# Patient Record
Sex: FEMALE | Race: WHITE | NOT HISPANIC OR LATINO | Employment: UNEMPLOYED | URBAN - METROPOLITAN AREA
[De-identification: names, ages, dates, MRNs, and addresses within clinical notes are randomized per-mention and may not be internally consistent; named-entity substitution may affect disease eponyms.]

---

## 2023-10-16 ENCOUNTER — OFFICE VISIT (OUTPATIENT)
Dept: PRIMARY CARE | Facility: CLINIC | Age: 33
End: 2023-10-16
Payer: COMMERCIAL

## 2023-10-16 VITALS
OXYGEN SATURATION: 98 % | HEART RATE: 98 BPM | TEMPERATURE: 95.8 F | BODY MASS INDEX: 38.09 KG/M2 | HEIGHT: 63 IN | DIASTOLIC BLOOD PRESSURE: 84 MMHG | SYSTOLIC BLOOD PRESSURE: 128 MMHG | WEIGHT: 215 LBS

## 2023-10-16 DIAGNOSIS — Z00.00 WELL ADULT EXAM: Primary | ICD-10-CM

## 2023-10-16 DIAGNOSIS — E04.9 ENLARGED THYROID: ICD-10-CM

## 2023-10-16 DIAGNOSIS — R53.83 OTHER FATIGUE: ICD-10-CM

## 2023-10-16 PROBLEM — O14.10 SEVERE PRE-ECLAMPSIA (HHS-HCC): Status: ACTIVE | Noted: 2018-10-09

## 2023-10-16 PROCEDURE — 3079F DIAST BP 80-89 MM HG: CPT | Performed by: FAMILY MEDICINE

## 2023-10-16 PROCEDURE — 3074F SYST BP LT 130 MM HG: CPT | Performed by: FAMILY MEDICINE

## 2023-10-16 PROCEDURE — 99395 PREV VISIT EST AGE 18-39: CPT | Performed by: FAMILY MEDICINE

## 2023-10-16 ASSESSMENT — PATIENT HEALTH QUESTIONNAIRE - PHQ9
SUM OF ALL RESPONSES TO PHQ9 QUESTIONS 1 AND 2: 0
1. LITTLE INTEREST OR PLEASURE IN DOING THINGS: NOT AT ALL
2. FEELING DOWN, DEPRESSED OR HOPELESS: NOT AT ALL

## 2023-10-16 NOTE — PROGRESS NOTES
Subjective   Patient ID: Francy Zafar is a 33 y.o. female who presents for well exam  HPI  Has had increased heat production and having low energy.  She restarted periods and are getting regular.  Normal menstruation.  Gets anxiuos sensation.  She states that they started coming on over 2 weeks ago.  Blood flow.  Having more headaches over the past couple weeks.  Bacnd around the head disctribution.  No lights, nasuea or vomiting.  She does have some vertigo.        The patient's health since the last visit is described as good. There are no interval changes in the patient's PMH, PSH, and current medications. There are no interval changes in the patient's social and family history. She has regular dental visits. She denies vision problems. She denies hearing loss.   Lifestyle: She consumes a diverse and healthy diet.  Mostly organic.   She does not have any weight concerns. She exercises regularly. She does not use tobacco. She denies alcohol use.   Reproductive health: the patient is postmenopausal.   Cervical cancer screening: cancer screening reviewed and current . patient has no history of an abnormal pap smear.     Surgical History  Problems    · No history of surgery     Family History  Father    · Family history of Benign hypertension  Grandparent    · Family history of diabetes mellitus    · Family history of malignant neoplasm ( Grandmother had esophageal)   · Family history of myocardial infarction   Uncle    · FH: kidney failure      Social History  Problems    · Never a smoker   · Social alcohol use     Allergies  Medication    · Penicillins  NonMedication    · IV Contrast Dye    Review of Systems    Objective   Physical Exam  General: Patient is alert and oriented Ãƒâ€”3 and appears in no acute distress. No respiratory distress.     Head: Atraumatic normocephalic.     Eyes: EOMI, PERRLA      Ears: Canals patent without any irritation, tympanic membranes without inflammation, no swelling, normal  light reflex.     Nose: Nares patent. Turbinates are not swollen. No discharge.     Mouth: Normal mucosa. Moist. No erythema, exudates, tonsillar enlargement.     Neck: Normal range of motion, no masses. Thyroid is palpable and normal in size without any nodules. No anterior cervical or posterior cervical adenopathy.     Heart: Regular rate and rhythm, no murmurs clicks or gallops     Lungs: Clear to auscultation bilaterally without any rhonchi rales or wheezing, lung sounds heard throughout all lung fields     Abdomen: Soft, nontender, no rigidity, rebound, guarding or organomegaly. Bowel sounds Ãƒâ€”4 quadrants.     Musculoskeletal: Normal range of motion, strength is grossly intact in the proximal distal muscles of the upper and lower extremities bilaterally, deep tendon reflexes +2 out of 4 and symmetric bilaterally at the patella, Achilles, biceps, triceps, sensation intact.     Nerves: Cranial nerves II through XII appear grossly intact and without deficit     Skin: Intact, dry, no rashes or erythema     Psych: Normal affect.  Assessment/Plan   Problem List Items Addressed This Visit    None  Visit Diagnoses       Well adult exam    -  Primary    Other fatigue            Patient is a well 33-year-old female  Anticipatory guidance given  Labs ordered  Follow-up as needed or in 1 year for well check

## 2023-10-25 ENCOUNTER — PATIENT MESSAGE (OUTPATIENT)
Dept: PRIMARY CARE | Facility: CLINIC | Age: 33
End: 2023-10-25
Payer: COMMERCIAL

## 2023-10-25 DIAGNOSIS — R79.89 ELEVATED LFTS: Primary | ICD-10-CM

## 2023-10-27 ENCOUNTER — HOSPITAL ENCOUNTER (OUTPATIENT)
Dept: RADIOLOGY | Facility: HOSPITAL | Age: 33
Discharge: HOME | End: 2023-10-27
Payer: COMMERCIAL

## 2023-10-27 DIAGNOSIS — E04.9 ENLARGED THYROID: ICD-10-CM

## 2023-10-27 DIAGNOSIS — R79.89 ELEVATED LFTS: ICD-10-CM

## 2023-10-27 PROCEDURE — 76536 US EXAM OF HEAD AND NECK: CPT | Performed by: RADIOLOGY

## 2023-10-27 PROCEDURE — 76705 ECHO EXAM OF ABDOMEN: CPT

## 2023-10-27 PROCEDURE — 76536 US EXAM OF HEAD AND NECK: CPT

## 2023-10-27 PROCEDURE — 76705 ECHO EXAM OF ABDOMEN: CPT | Performed by: RADIOLOGY

## 2023-10-29 NOTE — RESULT ENCOUNTER NOTE
Let the patient know taht the US overall was normal except she has gall stones  Not obstructing at this time

## 2023-10-30 ENCOUNTER — TELEPHONE (OUTPATIENT)
Dept: PRIMARY CARE | Facility: CLINIC | Age: 33
End: 2023-10-30
Payer: COMMERCIAL

## 2023-10-30 DIAGNOSIS — R79.89 ELEVATED LFTS: Primary | ICD-10-CM

## 2023-10-30 NOTE — TELEPHONE ENCOUNTER
----- Message from Nik Reynaga DO sent at 10/28/2023 10:36 PM EDT -----  Let patient know that the US of the thyroid was normal

## 2023-10-30 NOTE — TELEPHONE ENCOUNTER
----- Message from Nik Reynaga DO sent at 10/28/2023 10:40 PM EDT -----  Let the patient know taht the US overall was normal except she has gall stones  Not obstructing at this time

## 2024-02-13 ENCOUNTER — OFFICE VISIT (OUTPATIENT)
Dept: PRIMARY CARE | Facility: CLINIC | Age: 34
End: 2024-02-13
Payer: COMMERCIAL

## 2024-02-13 VITALS
HEIGHT: 63 IN | SYSTOLIC BLOOD PRESSURE: 108 MMHG | HEART RATE: 78 BPM | OXYGEN SATURATION: 98 % | DIASTOLIC BLOOD PRESSURE: 64 MMHG | WEIGHT: 204 LBS | BODY MASS INDEX: 36.14 KG/M2 | TEMPERATURE: 97.9 F

## 2024-02-13 DIAGNOSIS — R42 LIGHTHEADEDNESS: ICD-10-CM

## 2024-02-13 DIAGNOSIS — K80.20 CALCULUS OF GALLBLADDER WITHOUT CHOLECYSTITIS WITHOUT OBSTRUCTION: ICD-10-CM

## 2024-02-13 DIAGNOSIS — G44.82 HEADACHE ASSOCIATED WITH SEXUAL ACTIVITY: ICD-10-CM

## 2024-02-13 DIAGNOSIS — R07.89 ATYPICAL CHEST PAIN: Primary | ICD-10-CM

## 2024-02-13 DIAGNOSIS — R79.89 ELEVATED LFTS: ICD-10-CM

## 2024-02-13 PROCEDURE — 93000 ELECTROCARDIOGRAM COMPLETE: CPT | Performed by: FAMILY MEDICINE

## 2024-02-13 PROCEDURE — 99214 OFFICE O/P EST MOD 30 MIN: CPT | Performed by: FAMILY MEDICINE

## 2024-02-13 PROCEDURE — 3074F SYST BP LT 130 MM HG: CPT | Performed by: FAMILY MEDICINE

## 2024-02-13 PROCEDURE — 3078F DIAST BP <80 MM HG: CPT | Performed by: FAMILY MEDICINE

## 2024-02-13 RX ORDER — SUMATRIPTAN 50 MG/1
50 TABLET, FILM COATED ORAL ONCE AS NEEDED
Qty: 10 TABLET | Refills: 11 | Status: SHIPPED | OUTPATIENT
Start: 2024-02-13 | End: 2024-06-12

## 2024-02-13 ASSESSMENT — PATIENT HEALTH QUESTIONNAIRE - PHQ9
1. LITTLE INTEREST OR PLEASURE IN DOING THINGS: NOT AT ALL
2. FEELING DOWN, DEPRESSED OR HOPELESS: NOT AT ALL
SUM OF ALL RESPONSES TO PHQ9 QUESTIONS 1 AND 2: 0

## 2024-02-13 NOTE — PROGRESS NOTES
Subjective   Patient ID: Francy Zafar is a 33 y.o. female who presents for Dizziness (Dizziness comes and goes, sometimes a bad pain on left side under her breast then moves up in her arm. Only happens at night. Not breastfeeding anymore. Blood pressure is good. Goes to a chiropractor. Her left side by ear swells up sometimes and get dizzy when she has intercourse and exercises . When she starts her menstrual cycle starts she gets a bad migraine).  HPI  Patient had not been feeling right since Oct.  She had gall stones and this seems to improve on the AF Beta food.  Headache after sexual activity.  She Also has tried to start exercising and causes lightheaded and dizziness.  Last Thursday she bent down and then standing up felt lightheaded and dizzy and felt like a brick on her chest.  It seemed to last a long time. She will get a stomach pain unde the left rib cage at night and will radiate to the left shoulder.   She denies palpitations on a regular basis.  Having regular periuods and having a migraine the day before.   Family history of grandmother having MI at 39 but was a smoker.  Uncle had an MI in his 50's/    The headache after sex is a pressure headache vs a pain.        Dizziness, is not vertigo but a lightheaded and feels like she is going to be nauseous.  She feels like the left neck will swell at times.  She feels it is worse sitting to standing or spinning around fast or bending and standing.   She is staying hydrated.      Had elevated LFT's in October and they were coming down.  Deneis RUQ pain at this time.      Does not feell mentally anxious but body feels anxious.        Review of Systems  .  Objective   Physical Exam  General: Patient is alert and oriented ×3 and appears in no acute distress. No respiratory distress.    Head: Atraumatic normocephalic.    Eyes: EOMI, PERRLA      Ears: Canals patent without any irritation, tympanic membranes without inflammation, no swelling, normal light  reflex.  Hearing intact bilaterally    Mouth: Normal mucosa. Moist. No erythema, exudates, tonsillar enlargement.    Neck: Normal range of motion, no masses.  Thyroid is palpable and normal in size without any nodules. No anterior cervical or posterior cervical adenopathy.    Heart: Regular rate and rhythm, no murmurs clicks or gallops    Lungs: Clear to auscultation bilaterally without any rhonchi rales or wheezing, lung sounds heard throughout all lung fields    Abdomen: Soft, nontender, no rigidity, rebound, guarding or organomegaly. Bowel sounds ×4 quadrants.    Musculoskeletal: Normal range of motion, strength is grossly intact in the proximal distal muscles of the upper and lower extremities bilaterally, deep tendon reflexes +2 out of 4 and symmetric bilaterally at the patella, Achilles, biceps, triceps, sensation intact.    Nerves: Cranial nerves II through XII appear grossly intact and without deficit    Skin: Intact, dry, no rashes or erythema    Psych: Normal affect.   Assessment/Plan   Problem List Items Addressed This Visit    None  Visit Diagnoses       Atypical chest pain    -  Primary    Relevant Orders    ECG 12 Lead    High sensitivity CRP    Lightheadedness        Relevant Orders    CBC and Auto Differential    Comprehensive Metabolic Panel    TSH with reflex to Free T4 if abnormal    Cortisol AM    Elevated LFTs        Relevant Orders    Gamma GT    Calculus of gallbladder without cholecystitis without obstruction        Headache associated with sexual activity            Atypical CP  - Labs ordered  - EKG done in the office and showed normal sinus rate and rhythm      Lightheadedness  - Orthostatic BP done in the office and were normal with laying 102/78, sitting 104/80, standing 110/80.    Elevated LFT's  - Repeating    Cholelithiasis  US 10/23.     Sexual Headache  - Start Sumatriptan 50 mg and can take at the very onset of a migraine or take 30 min prior to sexual activity.   If this causes  issues or side effects we will prescribe Ubrelvy.  Copay card given if needed.      Follow up in 6 weeks or as needed

## 2024-03-26 ENCOUNTER — OFFICE VISIT (OUTPATIENT)
Dept: PRIMARY CARE | Facility: CLINIC | Age: 34
End: 2024-03-26
Payer: COMMERCIAL

## 2024-03-26 VITALS
SYSTOLIC BLOOD PRESSURE: 108 MMHG | HEIGHT: 63 IN | OXYGEN SATURATION: 97 % | WEIGHT: 203 LBS | TEMPERATURE: 97.5 F | DIASTOLIC BLOOD PRESSURE: 74 MMHG | BODY MASS INDEX: 35.97 KG/M2 | HEART RATE: 71 BPM

## 2024-03-26 DIAGNOSIS — G44.82 HEADACHE ASSOCIATED WITH SEXUAL ACTIVITY: Primary | ICD-10-CM

## 2024-03-26 DIAGNOSIS — L30.9 DERMATITIS: ICD-10-CM

## 2024-03-26 DIAGNOSIS — R42 LIGHTHEADEDNESS: ICD-10-CM

## 2024-03-26 PROCEDURE — 3074F SYST BP LT 130 MM HG: CPT | Performed by: FAMILY MEDICINE

## 2024-03-26 PROCEDURE — 1036F TOBACCO NON-USER: CPT | Performed by: FAMILY MEDICINE

## 2024-03-26 PROCEDURE — 3078F DIAST BP <80 MM HG: CPT | Performed by: FAMILY MEDICINE

## 2024-03-26 PROCEDURE — 99214 OFFICE O/P EST MOD 30 MIN: CPT | Performed by: FAMILY MEDICINE

## 2024-03-26 RX ORDER — CLOTRIMAZOLE AND BETAMETHASONE DIPROPIONATE 10; .64 MG/G; MG/G
1 CREAM TOPICAL 2 TIMES DAILY
Qty: 30 G | Refills: 0 | Status: SHIPPED | OUTPATIENT
Start: 2024-03-26 | End: 2024-04-09

## 2024-03-26 ASSESSMENT — PATIENT HEALTH QUESTIONNAIRE - PHQ9
2. FEELING DOWN, DEPRESSED OR HOPELESS: NOT AT ALL
1. LITTLE INTEREST OR PLEASURE IN DOING THINGS: NOT AT ALL
SUM OF ALL RESPONSES TO PHQ9 QUESTIONS 1 AND 2: 0

## 2024-03-26 NOTE — PROGRESS NOTES
Subjective   Patient ID: Francy Zafar is a 33 y.o. female who presents for Follow-up (6 WEEK FOLLOW UP/Did take the meds you gave her, it worked with taking the pain away , but she was sweating and agitated. /Spot on her finger right hand middle finger and spot on her buttocks like a red rash in a [place] had it for about a week. /Bottom lip burns on the inside. Hasn't linked a food to it.).  HPI  Sumitriptan worked but caused side effects    Patient had a spot on her left buttock and right buttock.  She states started 1 week ago.  No itchiness or irritaion but burns in the shower.  Right hand middle finger feels tender and raw.  No itching.    Has tried young living healing healing lotion and skin repair.    Nobody at home has similar rashes.    No new soaps lotions or detergents.  Has 2 dogs.  Mostly inside.    Review of Systems    Objective   Physical Exam  General: Patient is alert and oriented ×3 and appears in no acute distress. No respiratory distress.    Head: Atraumatic normocephalic.    Eyes: EOMI, PERRLA      Heart: Regular rate and rhythm, no murmurs clicks or gallops    Lungs: Clear to auscultation bilaterally without any rhonchi rales or wheezing, lung sounds heard throughout all lung fields    Nerves: Cranial nerves II through XII appear grossly intact and without deficit    Skin: Left buttock there is a 1.25 cm annular skin lesion raised border dry center, right middle finger laterally there is an area that is dry with some tiny vesicular lesions noted towards the tip of the finger    Psych: Normal affect.  Assessment/Plan   Problem List Items Addressed This Visit    None  Visit Diagnoses       Headache associated with sexual activity    -  Primary    Relevant Medications    ubrogepant (Ubrelvy) 100 mg tablet tablet    Dermatitis        Relevant Medications    clotrimazole-betamethasone (Lotrisone) cream    Lightheadedness              Dermatitis-most likely herpes katherine on the finger and  possibly on the buttock as well.  L Lysine-  1000 mg 2-3 x day  Betamethasone with clotrimazole- 2 x day for 14 days  Continue current creams that you are using    Patient failed on sumatriptan hand 50 mg due to side effects.  The medication did help out with the headache but did occur with climax.  We are going to give her Ubrelvy and have her take half tablet to see if this helps.  Given co-pay card.    Dizziness has only happened a handful of times since last visit and the patient is watching how she is bending and getting up.  This seems to be helping.  She is also using a little bit of salt in her water.  Suggested compression stockings as well.

## 2024-10-16 ENCOUNTER — APPOINTMENT (OUTPATIENT)
Dept: PRIMARY CARE | Facility: CLINIC | Age: 34
End: 2024-10-16
Payer: COMMERCIAL

## 2024-10-16 VITALS
WEIGHT: 208 LBS | SYSTOLIC BLOOD PRESSURE: 120 MMHG | HEIGHT: 63 IN | DIASTOLIC BLOOD PRESSURE: 80 MMHG | BODY MASS INDEX: 36.86 KG/M2 | OXYGEN SATURATION: 99 % | HEART RATE: 73 BPM

## 2024-10-16 DIAGNOSIS — R53.83 OTHER FATIGUE: ICD-10-CM

## 2024-10-16 DIAGNOSIS — F32.2 SEVERE MAJOR DEPRESSION, SINGLE EPISODE, WITHOUT PSYCHOTIC FEATURES (MULTI): ICD-10-CM

## 2024-10-16 DIAGNOSIS — Z00.00 WELL ADULT EXAM: Primary | ICD-10-CM

## 2024-10-16 DIAGNOSIS — R10.826 EPIGASTRIC ABDOMINAL TENDERNESS WITH REBOUND TENDERNESS: ICD-10-CM

## 2024-10-16 PROBLEM — E04.9 GOITER: Status: ACTIVE | Noted: 2024-10-16

## 2024-10-16 PROCEDURE — 99395 PREV VISIT EST AGE 18-39: CPT | Performed by: FAMILY MEDICINE

## 2024-10-16 PROCEDURE — 3079F DIAST BP 80-89 MM HG: CPT | Performed by: FAMILY MEDICINE

## 2024-10-16 PROCEDURE — 3074F SYST BP LT 130 MM HG: CPT | Performed by: FAMILY MEDICINE

## 2024-10-16 PROCEDURE — 3008F BODY MASS INDEX DOCD: CPT | Performed by: FAMILY MEDICINE

## 2024-10-16 NOTE — PROGRESS NOTES
Subjective   Patient ID: Francy Zafar is a 34 y.o. female who presents for Annual Exam.              HPI  1.  Physical exam.  Pap: last done 2022, no history of abnormal readings  Immunizations: flu    2. Pelvic pain  Seeing OBGYN  Getting hormones tested  Regular periods after stopped breastfeeding  Last 2-3 periods     3. Stomach pain   Ion gut restore, helped a little   Heartburn with pregnancies   Sometimes worsens with food  No nsaids, 1-2 espresso per day     4. Fatigue  Getting dizzy when bending down and standing up   Work up completed in February  Drinks plenty of water   Sleeping regularly, but not feeling refreshed in the morning    5. Mental health  Post pregnancy depression   Feels stressed often, overwhelmed  Has been going on since February  Panic attack 5 days ago    6. Headaches  Ubrelvy with headaches  Last time she took one in August, 1/2 only  No issues since starting     Review of Systems  All pertinent positive symptoms are included in the history of present illness.    All other systems have been reviewed and are negative and noncontributory to this patient's current ailments.    No past medical history on file.  Past Surgical History:   Procedure Laterality Date    OTHER SURGICAL HISTORY  11/27/2019    No history of surgery     Social History     Tobacco Use    Smoking status: Never     Passive exposure: Never    Smokeless tobacco: Never     No family history on file.    There is no immunization history on file for this patient.  Current Outpatient Medications   Medication Instructions    SUMAtriptan (IMITREX) 50 mg, oral, Once as needed, May repeat after 2 hours.    ubrogepant (UBRELVY) 100 mg, oral, Once as needed     Allergies   Allergen Reactions    Iodinated Contrast Media Unknown    Nitrofurantoin Monohyd/M-Cryst Unknown    Penicillins Unknown    Soy Protein Other    Wheat Other       Objective   Vitals:    10/16/24 1515   BP: 120/80   BP Location: Right arm   Patient Position:  "Sitting   BP Cuff Size: Adult   Pulse: 73   SpO2: 99%   Weight: 94.3 kg (208 lb)   Height: 1.6 m (5' 3\")     Body mass index is 36.85 kg/m².    BP Readings from Last 3 Encounters:   10/16/24 120/80   03/26/24 108/74   02/13/24 108/64      Wt Readings from Last 3 Encounters:   10/16/24 94.3 kg (208 lb)   03/26/24 92.1 kg (203 lb)   02/13/24 92.5 kg (204 lb)        No visits with results within 1 Month(s) from this visit.   Latest known visit with results is:   No results found for any previous visit.     Physical Exam  General: Pt is sitting up in chair. Appears well-nourished. In no acute distress.  HENT: TM clear bilaterally. Oropharynx without erythema or exudate.  Neck: No cervical lymphadenopathy. No thyromegaly. No carotid bruits.  CV: S1S2 normal. Regular rate and rhythm. No murmurs, rubs, or gallops.  Resp: Clear to auscultation in all lobes. Good aeration. No rales, rhonchi, or wheezes.  GI: Soft,  tenderness to palpation epigastric. No organomegaly. No abdominal bruits.   Extremities: No lower extremity edema bilaterally.   Skin: Warm and dry. No rashes or bruising.   Psych: Appropriate responses and actions.       Assessment/Plan   Problem List Items Addressed This Visit    None  Visit Diagnoses       Well adult exam    -  Primary    Epigastric abdominal tenderness with rebound tenderness        Relevant Orders    Amylase    Comprehensive Metabolic Panel    CBC and Auto Differential    H. Pylori Breath Test    Celiac Panel    Lipase    Other fatigue        Severe major depression, single episode, without psychotic features (Multi)            B Complex  Magnesium  Omega 3 supplement  Ahswagandha and L-Theanine 200-600 mg 2 x day for stress    Lexapro 10 mg daily 1/2 tab  - Discussed the risks and benefits  - PHQ 9 was 16 qnd Pato 7 was 12  -Follow-up in 4 to 6 weeks    Epigastric tenderness.   -Most likely gastritis.  We discussed her previous gallbladder issues and gallstones and she does not feel like this " is the same pain.  Feels that she is doing very well that way.  - Discussed that she has been taking Tums occasionally and this does help out with the pain.  Suggested that we try a PPI for 1 month.  - Labs were ordered  - Instructed not to drink hot beverages could burn her esophagus or stomach

## 2024-11-20 ENCOUNTER — APPOINTMENT (OUTPATIENT)
Dept: PRIMARY CARE | Facility: CLINIC | Age: 34
End: 2024-11-20
Payer: COMMERCIAL

## 2025-04-28 DIAGNOSIS — G44.82 HEADACHE ASSOCIATED WITH SEXUAL ACTIVITY: ICD-10-CM

## 2025-05-14 ENCOUNTER — TELEPHONE (OUTPATIENT)
Dept: PRIMARY CARE | Facility: CLINIC | Age: 35
End: 2025-05-14
Payer: COMMERCIAL

## 2025-05-14 NOTE — TELEPHONE ENCOUNTER
Patient called inquiring about a prior auth for Ubrelvy, looks like Marylin submitted this back in April and we never received anything stating the medication had to be submitted through Cover my meds as [facility] doesn't handle her medication. I called [facility] and spoke with Sharda and she advised that I would have to [facility] as [facility] are the ones that handles the medications. I spoke with Luis at [facility] and he advised that they no longer handle anything with [facility] and anything that comes through them will just get denied. I had the patient call her insurance as I wasn't getting anywhere and they advised her that [facility] handles the medication directly through them, I called [facility] back spoke with Corey and she is going to fax over a form to submit back to them.

## 2025-10-20 ENCOUNTER — APPOINTMENT (OUTPATIENT)
Dept: PRIMARY CARE | Facility: CLINIC | Age: 35
End: 2025-10-20
Payer: COMMERCIAL